# Patient Record
Sex: FEMALE | URBAN - METROPOLITAN AREA
[De-identification: names, ages, dates, MRNs, and addresses within clinical notes are randomized per-mention and may not be internally consistent; named-entity substitution may affect disease eponyms.]

---

## 2025-05-02 ENCOUNTER — TELEPHONE (OUTPATIENT)
Dept: ORTHOPEDIC SURGERY | Age: 60
End: 2025-05-02

## 2025-05-02 NOTE — TELEPHONE ENCOUNTER
LVM for pt that Dr. Ybarra does PRP for the thumb joints.  ADvised her to call us back to set up an appt for PRP consultation.

## 2025-05-02 NOTE — TELEPHONE ENCOUNTER
----- Message from Nati PAINTING sent at 5/2/2025 10:42 AM EDT -----  Regarding: Specialty Message to Provider  Specialty Message to Provider    Relationship to Patient: Self     Patient Message: THE PT NEEDS TO KNOW IF DR EASON DOES PRP INJECTIONS OF THE THUMB JOINT.  PLEASE CALL HER AT THE BELOW #  ------------------------------------------------------------------------------------------------------------------------    Call Back Information: OK to leave message on voicemail  Preferred Call Back Number:  [phone]